# Patient Record
Sex: MALE | Race: WHITE | URBAN - NONMETROPOLITAN AREA
[De-identification: names, ages, dates, MRNs, and addresses within clinical notes are randomized per-mention and may not be internally consistent; named-entity substitution may affect disease eponyms.]

---

## 2017-01-01 ENCOUNTER — AMBULATORY - GICH (OUTPATIENT)
Dept: SCHEDULING | Facility: OTHER | Age: 72
End: 2017-01-01

## 2017-01-01 ENCOUNTER — COMMUNICATION - GICH (OUTPATIENT)
Dept: INTERNAL MEDICINE | Facility: OTHER | Age: 72
End: 2017-01-01

## 2017-01-01 ENCOUNTER — HISTORY (OUTPATIENT)
Dept: INTERNAL MEDICINE | Facility: OTHER | Age: 72
End: 2017-01-01

## 2017-01-01 ENCOUNTER — COMMUNICATION - GICH (OUTPATIENT)
Dept: GERIATRICS | Facility: OTHER | Age: 72
End: 2017-01-01

## 2017-01-01 ENCOUNTER — TRANSFERRED RECORDS (OUTPATIENT)
Dept: HEALTH INFORMATION MANAGEMENT | Facility: CLINIC | Age: 72
End: 2017-01-01

## 2017-01-01 ENCOUNTER — AMBULATORY - GICH (OUTPATIENT)
Dept: LAB | Facility: OTHER | Age: 72
End: 2017-01-01

## 2017-01-01 ENCOUNTER — OFFICE VISIT - GICH (OUTPATIENT)
Dept: INTERNAL MEDICINE | Facility: OTHER | Age: 72
End: 2017-01-01

## 2017-01-01 ENCOUNTER — HISTORY (OUTPATIENT)
Dept: FAMILY MEDICINE | Facility: OTHER | Age: 72
End: 2017-01-01

## 2017-01-01 ENCOUNTER — HOSPITAL ENCOUNTER (OUTPATIENT)
Dept: RADIOLOGY | Facility: OTHER | Age: 72
End: 2017-03-10
Attending: NURSE PRACTITIONER

## 2017-01-01 ENCOUNTER — AMBULATORY - GICH (OUTPATIENT)
Dept: INTERNAL MEDICINE | Facility: OTHER | Age: 72
End: 2017-01-01

## 2017-01-01 ENCOUNTER — HOSPITAL ENCOUNTER (OUTPATIENT)
Dept: RADIOLOGY | Facility: OTHER | Age: 72
End: 2017-03-09
Attending: NURSE PRACTITIONER

## 2017-01-01 ENCOUNTER — HOSPITAL ENCOUNTER (OUTPATIENT)
Dept: RADIOLOGY | Facility: OTHER | Age: 72
End: 2017-03-07
Attending: NURSE PRACTITIONER

## 2017-01-01 ENCOUNTER — MEDICAL CORRESPONDENCE (OUTPATIENT)
Facility: CLINIC | Age: 72
End: 2017-01-01
Payer: MEDICARE

## 2017-01-01 ENCOUNTER — OFFICE VISIT - GICH (OUTPATIENT)
Dept: FAMILY MEDICINE | Facility: OTHER | Age: 72
End: 2017-01-01

## 2017-01-01 DIAGNOSIS — Z12.11 ENCOUNTER FOR SCREENING FOR MALIGNANT NEOPLASM OF COLON: ICD-10-CM

## 2017-01-01 DIAGNOSIS — R10.10 UPPER ABDOMINAL PAIN: ICD-10-CM

## 2017-01-01 DIAGNOSIS — R93.7 ABNORMAL FINDINGS ON DIAGNOSTIC IMAGING OF OTHER PARTS OF MUSCULOSKELETAL SYSTEM: ICD-10-CM

## 2017-01-01 DIAGNOSIS — I48.3 TYPICAL ATRIAL FLUTTER (H): ICD-10-CM

## 2017-01-01 DIAGNOSIS — R00.0 TACHYCARDIA: ICD-10-CM

## 2017-01-01 DIAGNOSIS — R63.4 ABNORMAL WEIGHT LOSS: ICD-10-CM

## 2017-01-01 DIAGNOSIS — K92.1 MELENA: ICD-10-CM

## 2017-01-01 DIAGNOSIS — M54.10 RADICULOPATHY: ICD-10-CM

## 2017-01-01 DIAGNOSIS — G89.29 OTHER CHRONIC PAIN: ICD-10-CM

## 2017-01-01 DIAGNOSIS — M05.79 RHEUMATOID ARTHRITIS OF MULTIPLE SITES WITHOUT ORGAN OR SYSTEM INVOLVEMENT WITH POSITIVE RHEUMATOID FACTOR (H): ICD-10-CM

## 2017-01-01 DIAGNOSIS — E03.4 ACQUIRED ATROPHY OF THYROID: ICD-10-CM

## 2017-01-01 DIAGNOSIS — M54.42 LOW BACK PAIN WITH LEFT-SIDED SCIATICA: ICD-10-CM

## 2017-01-01 DIAGNOSIS — R35.1 NOCTURIA: ICD-10-CM

## 2017-01-01 DIAGNOSIS — M54.41 LOW BACK PAIN WITH RIGHT-SIDED SCIATICA: ICD-10-CM

## 2017-01-01 DIAGNOSIS — R05.9 COUGH: ICD-10-CM

## 2017-01-01 DIAGNOSIS — Z72.0 TOBACCO USE: ICD-10-CM

## 2017-01-01 DIAGNOSIS — R10.84 GENERALIZED ABDOMINAL PAIN: ICD-10-CM

## 2017-01-01 LAB
A/G RATIO - HISTORICAL: 0.9 (ref 1–2)
ABSOLUTE BASOPHILS - HISTORICAL: 0.2 THOU/CU MM
ABSOLUTE EOSINOPHILS - HISTORICAL: 0.1 THOU/CU MM
ABSOLUTE LYMPHOCYTES - HISTORICAL: 0.9 THOU/CU MM (ref 0.9–2.9)
ABSOLUTE MONOCYTES - HISTORICAL: 1.1 THOU/CU MM
ABSOLUTE NEUTROPHILS - HISTORICAL: 9.7 THOU/CU MM (ref 1.7–7)
ALBUMIN SERPL-MCNC: 3.4 G/DL (ref 3.5–5.7)
ALP SERPL-CCNC: 75 IU/L (ref 34–104)
ALT (SGPT) - HISTORICAL: 33 IU/L (ref 7–52)
AMYLASE SERPL-CCNC: 28 IU/L (ref 29–103)
ANION GAP - HISTORICAL: 9 (ref 5–18)
AST SERPL-CCNC: 32 IU/L (ref 13–39)
BACTERIA URINE: NORMAL BACTERIA/HPF
BASOPHILS # BLD AUTO: 1.9 %
BILIRUB SERPL-MCNC: 0.9 MG/DL (ref 0.3–1)
BILIRUB UR QL: ABNORMAL
BUN SERPL-MCNC: 20 MG/DL (ref 7–25)
BUN/CREAT RATIO - HISTORICAL: 23
CALCIUM SERPL-MCNC: 9.1 MG/DL (ref 8.6–10.3)
CHLORIDE SERPLBLD-SCNC: 96 MMOL/L (ref 98–107)
CLARITY, URINE: CLEAR CLARITY
CO2 SERPL-SCNC: 25 MMOL/L (ref 21–31)
COLOR UR: YELLOW COLOR
CREAT SERPL-MCNC: 0.87 MG/DL (ref 0.7–1.3)
EOSINOPHIL NFR BLD AUTO: 0.9 %
EPITHELIAL CELLS: NORMAL EPI/HPF
ERYTHROCYTE [DISTWIDTH] IN BLOOD BY AUTOMATED COUNT: 15.1 % (ref 11.5–15.5)
GFR IF NOT AFRICAN AMERICAN - HISTORICAL: >60 ML/MIN/1.73M2
GLOBULIN - HISTORICAL: 3.7 G/DL (ref 2–3.7)
GLUCOSE SERPL-MCNC: 111 MG/DL (ref 70–105)
GLUCOSE URINE: NEGATIVE MG/DL
HCT VFR BLD AUTO: 41.2 % (ref 37–53)
HEMOCCULT SP1 STL QL: POSITIVE
HEMOCCULT SP2 STL QL: POSITIVE
HEMOCCULT SP3 STL QL: NEGATIVE
HEMOGLOBIN: 13.6 G/DL (ref 13.5–17.5)
KETONES UR QL: NEGATIVE MG/DL
LEUKOCYTE ESTERASE URINE: NEGATIVE
LIPASE SERPL-CCNC: 8.8 IU/L (ref 11–82)
LYMPHOCYTES NFR BLD AUTO: 7.9 % (ref 20–44)
MCH RBC QN AUTO: 33 PG (ref 26–34)
MCHC RBC AUTO-ENTMCNC: 33.1 G/DL (ref 32–36)
MCV RBC AUTO: 100 FL (ref 80–100)
MONOCYTES NFR BLD AUTO: 9 %
NEUTROPHILS NFR BLD AUTO: 80.3 % (ref 42–72)
NITRITE UR QL STRIP: NEGATIVE
OCCULT BLOOD,URINE - HISTORICAL: NEGATIVE
PH UR: 5.5 [PH]
PLATELET # BLD AUTO: 339 THOU/CU MM (ref 140–440)
PMV BLD: 8.8 FL (ref 6.5–11)
POTASSIUM SERPL-SCNC: 4.6 MMOL/L (ref 3.5–5.1)
PROT SERPL-MCNC: 7.1 G/DL (ref 6.4–8.9)
PROTEIN QUALITATIVE,URINE - HISTORICAL: NEGATIVE MG/DL
PSA TOTAL (DIAGNOSTIC) - HISTORICAL: 0.3 NG/ML
RBC - HISTORICAL: NORMAL /HPF
RED BLOOD COUNT - HISTORICAL: 4.13 MIL/CU MM (ref 4.3–5.9)
SODIUM SERPL-SCNC: 130 MMOL/L (ref 133–143)
SP GR UR STRIP: 1.02
SPECIMEN DATE DAY 1 - HISTORICAL: ABNORMAL
SPECIMEN DATE DAY 2 - HISTORICAL: ABNORMAL
SPECIMEN DATE DAY 3 - HISTORICAL: ABNORMAL
T3,FREE - HISTORICAL: 3.24 PG/ML (ref 2.5–3.9)
T4 FREE SERPL-MCNC: 1.39 NG/DL (ref 0.58–1.64)
TSH - HISTORICAL: 3.91 UIU/ML (ref 0.34–5.6)
UROBILINOGEN,QUALITATIVE - HISTORICAL: NORMAL EU/DL
WBC - HISTORICAL: NORMAL /HPF
WHITE BLOOD COUNT - HISTORICAL: 12.1 THOU/CU MM (ref 4.5–11)

## 2017-01-01 PROCEDURE — 93306 TTE W/DOPPLER COMPLETE: CPT | Mod: 26 | Performed by: INTERNAL MEDICINE

## 2017-01-01 ASSESSMENT — PATIENT HEALTH QUESTIONNAIRE - PHQ9: SUM OF ALL RESPONSES TO PHQ QUESTIONS 1-9: 0

## 2017-03-21 ENCOUNTER — AMBULATORY - GICH (OUTPATIENT)
Dept: SCHEDULING | Facility: OTHER | Age: 72
End: 2017-03-21

## 2018-01-03 NOTE — TELEPHONE ENCOUNTER
Patient Information     Patient Name MRN Sex Shine Bustamante [id number] Male 1945      Telephone Encounter by Keli Navarrete at 3/7/2017 11:36 AM     Author:  Keli Navarrete Service:  (none) Author Type:  (none)     Filed:  3/7/2017 11:36 AM Encounter Date:  3/7/2017 Status:  Signed     :  Keli Navarrete            Patient's wife notified.  Keli Navarrete LPN.......................... 3/7/2017  11:36 AM

## 2018-01-03 NOTE — NURSING NOTE
Patient Information     Patient Name MRN Sex Shine Bustamante [id number] Male 1945      Nursing Note by Marina Sun at 2017  9:00 AM     Author:  Marina Sun Service:  (none) Author Type:  (none)     Filed:  2017  9:20 AM Encounter Date:  2017 Status:  Signed     :  Marina Sun            Patient presents to the clinic with abdominal pain x 2 weeks.  He is able to eat and the pain does seem to get better when he eats.  He has arthritis and typically takes 4 ibuprofen BID.  Has stopped a couple of weeks ago.    He has not tried any OTC medications.    Marina Sun LPN....................2017 9:02 AM

## 2018-01-03 NOTE — PROGRESS NOTES
Patient Information     Patient Name MRN Sex Shine Bustamante [id number] Male 1945      Progress Notes by Wendy Barnes at 3/10/2017  9:16 AM     Author:  Wendy Barnes Service:  (none) Author Type:  Other Clinical Staff     Filed:  3/10/2017  9:17 AM Date of Service:  3/10/2017  9:16 AM Status:  Signed     :  Wendy Barnes (Other Clinical Staff)            Falls Risk Criteria:    Age 65 and older or under age 4        Sensory deficits    Poor vision    Use of ambulatory aides    Impaired judgment    Unable to walk independently    Meets High Risk criteria for falls:  Yes             1.  Do you have dizziness or vertigo?    no                    2.  Do you need help standing or walking?   yes                 3.  Have you fallen within the last 6 months?    no           4.  Has the patient been fasting?      no       If any risks are marked Yes, the following interventions are utilized:    Do not leave patient unattended     Assist patient in the dressing room and bathroom    Have ambulatory aides available throughout procedure    Involve patient s family if available      3

## 2018-01-03 NOTE — PROGRESS NOTES
Patient Information     Patient Name MRN Sex Shine Bustamante [id number] Male 1945      Progress Notes by Laura Lester at 3/9/2017 10:55 AM     Author:  Laura Lester Service:  (none) Author Type:  Other Clinical Staff     Filed:  3/9/2017 10:56 AM Date of Service:  3/9/2017 10:55 AM Status:  Signed     :  Laura Lester (Other Clinical Staff)            Falls Risk Criteria:    Age 65 and older or under age 4        Sensory deficits    Poor vision    Use of ambulatory aides    Impaired judgment    Unable to walk independently    Meets High Risk criteria for falls:  Yes             1.  Do you have dizziness or vertigo?    yes                    2.  Do you need help standing or walking?   yes                 3.  Have you fallen within the last 6 months?    no           4.  Has the patient been fasting?      yes       If any risks are marked Yes, the following interventions are utilized:    Do not leave patient unattended     Assist patient in the dressing room and bathroom    Have ambulatory aides available throughout procedure    Involve patient s family if available

## 2018-01-03 NOTE — TELEPHONE ENCOUNTER
Patient Information     Patient Name MRN Sex Shine Bustamante [id number] Male 1945      Telephone Encounter by Ruma Medina LPN at 3/7/2017 10:56 AM     Author:  Ruma Median LPN Service:  (none) Author Type:  NURS- Licensed Practical Nurse     Filed:  3/7/2017 10:58 AM Encounter Date:  3/7/2017 Status:  Signed     :  Ruma Medina LPN (NURS- Licensed Practical Nurse)            Left message for patient to return call, he was just seen. [facility] had printed out an Rx for lortab and this was not given to him. Rx is up at the unit 3 window. Please inform him.  Ruma Medina LPN.........3/7/2017   10:57 AM

## 2018-01-03 NOTE — PROGRESS NOTES
Patient Information     Patient Name MRN Sex Shine Bustamante [id number] Male 1945      Progress Notes by Triny Huff RN at 3/13/2017  5:56 PM     Author:  Triny Huff RN Service:  (none) Author Type:  NURS- Registered Nurse     Filed:  3/13/2017  5:58 PM Date of Service:  3/13/2017  5:56 PM Status:  Signed     :  Triny Huff RN (NURS- Registered Nurse)            This RN working as House Supervisor was called for medical records to be sent to [facility] in [place] [employer] for continuing care. Patient was being seen in their ER.

## 2018-01-03 NOTE — PROGRESS NOTES
Patient Information     Patient Name MRN Sex Shine Bustamante [id number] Male 1945      Progress Notes by Марина Sinha NP at 3/7/2017  8:10 AM     Author:  Марина Sinha NP Service:  (none) Author Type:  PHYS- Nurse Practitioner     Filed:  3/7/2017 12:05 PM Encounter Date:  3/7/2017 Status:  Signed     :  Марина Sinha NP (PHYS- Nurse Practitioner)            SUBJECTIVE:    Shine Zimmerman is a 72 y.o. male who presents for abdominal pain    HPI  he and his wife report that he has had abdominal pain for over one month. The pain is constant and located in the mid to upper abdomen and sometimes he will feel diffuse pain across his entire abdomen. He feels that the pain is getting worse. He has had a loss of appetite and has lost 20 pounds since last fall. He is not trying to lose weight. He had been drinking approximately 4 cans of beer per day but now only occasionally has one beer per day because of the poor appetite. He also takes ibuprofen 800 mg twice daily for rheumatoid arthritis pain. He switched over to Aleve 4 tablets daily for a couple weeks without improvement so returned back to the ibuprofen. He did have darker stools the past few days. He was seen in clinic approximately 3 weeks ago and was given a prescription for MiraLAX. He has been drinking that daily. He has been moving his bowels without difficulty but this does not seem to improve the abdominal pain. He does not have heartburn. No hematemesis. He continues to smoke 1-1-1/2 packs of cigarettes per day. He has had a cough for the past month that is productive of white phlegm. He does feel short of breath at times with coughing and is getting weaker.  He also has this chronic left flank pain. He has pain that radiates down the spine, into the buttocks and down to both knees. This is been occurring for several months. He had an MRI of the lumbar spine in 2016 which showed multilevel  degenerative disc disease. There was bulging disc at the T11-T12 level with some mass affect on the distal cord and some effacement of the CSF. Thoracic spine MRI was recommended by radiologist. He has not been set up for this thoracic MRI. He also did see neurologist Dr. Bruce and had EMG completed. Those results are not available. He was told that radicular symptoms may be related to chronic alcohol use. He also was recently found to have elevated TSH but then it was rechecked last month and normal. He is not on any thyroid medication.  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    No Known Allergies,   Family History       Problem   Relation Age of Onset     Cancer  Mother 40     reproductive       Unknown  Father      Alcoholism  Father      Unknown  Sister      Unknown  Brother      Unknown  Maternal Grandmother      Unknown  Maternal Grandfather      Unknown  Paternal Grandmother      Unknown  Paternal Grandfather    ,   Current Outpatient Prescriptions on File Prior to Visit       Medication  Sig Dispense Refill     polyethylene glycoL (CLEARLAX) 17 gram/dose powder Take 17 g by mouth once daily. 1 jar 2     No current facility-administered medications on file prior to visit.    ,   Past Medical History     Diagnosis  Date     Substance abuse    ,   Past Surgical History       Procedure   Laterality Date     Carpal tunnel release        bilaterally       Biopsy        subcutaneous nodule, benign       Hemorrhoid surgery       and   Social History        Substance Use Topics          Smoking status:   Current Every Day Smoker      Packs/day:  1.00      Types:  Cigarettes      Smokeless tobacco:   Never Used      Alcohol use   3.0 oz/week     0 Standard drinks or equivalent, 5 Cans of beer per week        Comment: beer         REVIEW OF SYSTEMS:  Review of Systems   Constitutional: Positive for malaise/fatigue and weight loss. Negative for chills, diaphoresis and fever.   HENT: Negative.   "  Eyes: Negative.    Respiratory: Positive for cough, sputum production and shortness of breath. Negative for hemoptysis and wheezing.    Cardiovascular: Negative.    Gastrointestinal: Positive for abdominal pain and melena. Negative for blood in stool, constipation, diarrhea, heartburn, nausea and vomiting.   Genitourinary: Positive for flank pain. Negative for dysuria and hematuria.        Chronic left flank, mid back pain as discussed in history of present illness.   Musculoskeletal: Positive for back pain and joint pain. Negative for falls.   Skin: Negative for rash.   Neurological: Positive for tingling, focal weakness and weakness. Negative for dizziness.        Reports weakness of both legs   Endo/Heme/Allergies: Negative for polydipsia. Does not bruise/bleed easily.   Psychiatric/Behavioral: Positive for substance abuse. Negative for depression and memory loss. The patient is not nervous/anxious and does not have insomnia.        OBJECTIVE:  /90  Pulse (!) 129  Temp 96  F (35.6  C) (Tympanic)  Ht 1.816 m (5' 11.5\")  Wt 90.3 kg (199 lb)  SpO2 93%  BMI 27.37 kg/m2  By reviewing previous clinic visit notes and vitals, tachycardia is not new.    EXAM:   Physical Exam   Constitutional: He is oriented to person, place, and time. No distress.   Ashen appearing, does not look. Appears chronically but not acutely ill   HENT:   Mouth/Throat: Oropharynx is clear and moist. No oropharyngeal exudate.   TMs clear bilaterally. Throat without erythema. No sores inside  mouth   Eyes: Conjunctivae are normal. No scleral icterus.   Neck: Neck supple. No JVD present. No thyromegaly present.   Cardiovascular: Normal rate and regular rhythm.  Exam reveals no gallop and no friction rub.    No murmur heard.  Tachycardia, 120 bpm   Pulmonary/Chest: Effort normal. No stridor. No respiratory distress. He has no wheezes. He has no rales.   Faint rhonchi left lung   Abdominal: Soft. Bowel sounds are normal. He exhibits no " distension and no mass. There is tenderness. There is no rebound and no guarding.   Diffuse tenderness in the upper abdomen mostly located between the epigastric and umbilicus. No hepatosplenomegaly palpable. No abdominal bruits or pulsatile masses. No CVA tenderness.   Musculoskeletal: He exhibits deformity. He exhibits no edema.   Deformities of hands secondary to rheumatoid arthritis   Lymphadenopathy:     He has no cervical adenopathy.   Neurological: He is alert and oriented to person, place, and time. He exhibits normal muscle tone. Coordination normal.   Normal strength bilateral lower extremities. No tenderness with palpation over the thoracic or lumbar spinal processes. He rubs over the left flank area as the area of chronic pain.   Skin: Skin is warm. Rash noted. He is not diaphoretic. No pallor.   Diffuse light pink macular rash over lower abdomen   Psychiatric: Mood, memory, affect and judgment normal.   Nursing note and vitals reviewed.  September 2016 abdominal ultrasound results with no findings for cause of abdominal pain.  September 2016 MRI lumbar spine results reviewed and discussed with patient, see history of present illness for details.    EKG: Atrial flutter 2:1 AV conduction, rate 128  chest x-ray: Small bilateral pleural effusions with mild interstitial edema as interpreted by radiologist    Results for orders placed or performed in visit on 03/07/17      COMP METABOLIC PANEL      Result  Value Ref Range    SODIUM 130 (L) 133 - 143 mmol/L    POTASSIUM 4.6 3.5 - 5.1 mmol/L    CHLORIDE 96 (L) 98 - 107 mmol/L    CO2,TOTAL 25 21 - 31 mmol/L    ANION GAP 9 5 - 18                    GLUCOSE 111 (H) 70 - 105 mg/dL    CALCIUM 9.1 8.6 - 10.3 mg/dL    BUN 20 7 - 25 mg/dL    CREATININE 0.87 0.70 - 1.30 mg/dL    BUN/CREAT RATIO           23                    GFR if African American >60 >60 ml/min/1.73m2    GFR if not African American >60 >60 ml/min/1.73m2    ALBUMIN 3.4 (L) 3.5 - 5.7 g/dL     PROTEIN,TOTAL 7.1 6.4 - 8.9 g/dL    GLOBULIN                  3.7 2.0 - 3.7 g/dL    A/G RATIO 0.9 (L) 1.0 - 2.0                    BILIRUBIN,TOTAL 0.9 0.3 - 1.0 mg/dL    ALK PHOSPHATASE 75 34 - 104 IU/L    ALT (SGPT) 33 7 - 52 IU/L    AST (SGOT) 32 13 - 39 IU/L   AMYLASE      Result  Value Ref Range    AMYLASE 28 (L) 29 - 103 IU/L   LIPASE      Result  Value Ref Range    LIPASE 8.8 (L) 11.0 - 82.0 IU/L   PSA TOTAL (DIAGNOSTIC)      Result  Value Ref Range    PSA TOTAL (DIAGNOSTIC) 0.296 <=3.100 ng/mL   CBC WITH AUTO DIFFERENTIAL      Result  Value Ref Range    WHITE BLOOD COUNT         12.1 (H) 4.5 - 11.0 thou/cu mm    RED BLOOD COUNT           4.13 (L) 4.30 - 5.90 mil/cu mm    HEMOGLOBIN                13.6 13.5 - 17.5 g/dL    HEMATOCRIT                41.2 37.0 - 53.0 %    MCV                       100 80 - 100 fL    MCH                       33.0 26.0 - 34.0 pg    MCHC                      33.1 32.0 - 36.0 g/dL    RDW                       15.1 11.5 - 15.5 %    PLATELET COUNT            339 140 - 440 thou/cu mm    MPV                       8.8 6.5 - 11.0 fL    NEUTROPHILS               80.3 (H) 42.0 - 72.0 %    LYMPHOCYTES               7.9 (L) 20.0 - 44.0 %    MONOCYTES                 9.0 <12.0 %    EOSINOPHILS               0.9 <8.0 %    BASOPHILS                 1.9 <3.0 %    ABSOLUTE NEUTROPHILS      9.7 (H) 1.7 - 7.0 thou/cu mm    ABSOLUTE LYMPHOCYTES      0.9 0.9 - 2.9 thou/cu mm    ABSOLUTE MONOCYTES        1.1 (H) <0.9 thou/cu mm    ABSOLUTE EOSINOPHILS      0.1 <0.5 thou/cu mm    ABSOLUTE BASOPHILS        0.2 <0.3 thou/cu mm     ASSESSMENT/PLAN:    ICD-10-CM    1. Pain of upper abdomen R10.10 CBC AND DIFFERENTIAL      COMP METABOLIC PANEL      AMYLASE      LIPASE      XR CHEST 2 VIEWS PA AND LATERAL      CT ABDOMEN PELVIS W      omeprazole (PRILOSEC) 40 mg Delayed-Release capsule      CBC AND DIFFERENTIAL      COMP METABOLIC PANEL      AMYLASE      LIPASE      CBC WITH AUTO DIFFERENTIAL      OCCULT  BLOOD STOOL 3 SPECIMENS GUAIAC   2. Weight loss R63.4 CBC AND DIFFERENTIAL      COMP METABOLIC PANEL      AMYLASE      LIPASE      XR CHEST 2 VIEWS PA AND LATERAL      CT ABDOMEN PELVIS W      CBC AND DIFFERENTIAL      COMP METABOLIC PANEL      AMYLASE      LIPASE      CBC WITH AUTO DIFFERENTIAL   3. Melena K92.1 OCCULT BLOOD STOOL 3 SPECIMENS GUAIAC   4. Abnormal MRI, spine R93.7 CBC AND DIFFERENTIAL      MR SPINE THORACIC WO      CBC AND DIFFERENTIAL      CBC WITH AUTO DIFFERENTIAL   5. Radiculopathy, unspecified spinal region M54.10 MR SPINE THORACIC WO   6. 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 Cough R05 CBC AND DIFFERENTIAL      XR CHEST 2 VIEWS PA AND LATERAL      CBC AND DIFFERENTIAL      CBC WITH AUTO DIFFERENTIAL   8. Nocturia R35.1 PSA TOTAL (DIAGNOSTIC)      URINALYSIS W REFLEX MICROSCOPIC IF POSITIVE      PSA TOTAL (DIAGNOSTIC)   9. Tachycardia R00.0 EKG 12 LEAD UNIT PERFORMED      AL ELECTROCARDIOGRAM COMPLETE   10. Typical atrial flutter (HC) I48.3 metoprolol succinate (TOPROL XL) 50 mg sustained-release tablet      ECHO COMPLETE WO CONTRAST   11. Rheumatoid arthritis involving multiple sites with positive rheumatoid factor (HC) M05.79 HYDROcodone-acetaminophen, 5-325 mg, (LORTAB 5-325) per tablet   12. Encounter for screening for malignant neoplasm of colon  Z12.11 OCCULT BLOOD STOOL 3 SPECIMENS GUAIAC   13. Tobacco abuse Z72.0         Plan:  1. Abdominal pain, weight loss, melana: Concerning for NSAID gastritis, PUD, malignancy, etc. He will discontinue all NSAIDs and also avoid aspirin products. He will be started on omeprazole 40 mg once daily. CT of abdomen and pelvis with contrast will be scheduled. Patient may additionally need panendoscopy. Will discuss CT of abdomen and pelvis results and Hemoccults studies with patient before determining if panendoscopy is warranted. 2. Rheumatoid arthritis: Patient has chronic RA pain which she is  taking NSAIDs. Since he is going to be stopping the NSAIDs he will be started on Lortab 5/325 mg 1-2 tablets 3 times daily as needed. Should not take medication at work, operating vehicle or machinery as this could cause fatigue. Other side effects reviewed and discussed with patient. He will also remain on the MiraLAX to prevent [id number] constipation. 3. Atrial flutter: He will be started on Toprol-XL 50 mg daily and an echocardiogram will be obtained. Discussed with patient that he is at high risk of CVA however with his GI symptoms do not want to add anticoagulation until Hemoccults are obtained. If these are negative then may need to start him on anticoagulation however will review CT of abdomen and pelvis results and determine if panendoscopy needs to be completed first.    He also will obtain a UA and bring back to lab this week with hemoccult cards.  4. Left mid low back pain and abnormal MRI (T11-12) and radiculopathy: He will be scheduled for MRI of thoracic spine. Explained to patient that his abdominal pain could be referred pain from his back.  Follow-up appointment in clinic in 1 week, sooner if any worsening. Side effects of omeprazole and Toprol-XL also were reviewed and discussed the patient and his wife.   Greater than 60 minutes of face-to-face time is spent with patient and his wife  Patient Instructions   1. Stop ibuprofen and aleve. Start Lortab 5/325 mg 1-2 tablets 3 times daily as needed for rheumatoid arthritis pain. Start omeprazole 40 mg daily for upper abdominal discomfort. Obtain heme occult cards from 3 different stools and bring back to clinic lab once obtained.  You will be scheduled for CT of abdomen and pelvis. Depending upon heme occult results, may need to be set up for upper and lower endoscopy. Also try to bring back the urine sample.  2. You will be started on Toprol-XL 50 mg daily to slow your heart rate. You have atrial flutter. You also will be set up for an  echocardiogram of your heart. You may need to start and anticoagulated but will not decide this until after we have more evaluation of your stomach.  3. You also will be set up for an MRI of the thoracic spine to evaluate your back pain further and also because of the questionable abnormality on the previous MRI.  Follow-up in clinic in 1 week, sooner with any problems.

## 2018-01-03 NOTE — TELEPHONE ENCOUNTER
Patient Information     Patient Name MRN Sex Shine Bustamante [id number] Male 1945      Telephone Encounter by Марина Sinha NP at 3/10/2017  9:24 AM     Author:  Марина Sinha NP Service:  (none) Author Type:  PHYS- Nurse Practitioner     Filed:  3/10/2017  9:31 AM Encounter Date:  3/10/2017 Status:  Signed     :  Марина Sinha NP (PHYS- Nurse Practitioner)            Called and spoke with [facility]'s cardiologist, Dr Christina regarding 2:1 a flutter with rate in 120's and EF of 13% with severe global hypokinesia.  Their office will set up appt to see patient early next week.  He recommended that patient stay on Toprol-XL 50 mg and that if he develops symptoms of acute heart failure before cardiology visit to be seen in the emergency department. Decided against anticoagulation at this time with patient's epigastric discomfort and melena even though hemoglobin was normal. Patient is still to bring in stool sample to evaluate for occult bleeding. Also notified patient and his wife of echocardiogram results and explained to them that cardiology will be giving them a call to set up an appointment for early next week. Also discussed signs and symptoms of acute heart failure decompensation and if he has any problems he'll be seen in the emergency department prior to cardiology visit.

## 2018-01-03 NOTE — NURSING NOTE
Patient Information     Patient Name MRN Sex Shine Bustamante [id number] Male 1945      Nursing Note by Ruma Medina LPN at 3/7/2017  8:10 AM     Author:  Ruma Medina LPN Service:  (none) Author Type:  NURS- Licensed Practical Nurse     Filed:  3/7/2017  8:25 AM Encounter Date:  3/7/2017 Status:  Signed     :  Ruma Medina LPN (NURS- Licensed Practical Nurse)            Shine Zimmerman is a 72 y.o. male here today for second opinion on ongoing stomach pain for the last couple weeks. He states that the pain is constant and at times it becomes worse then normal.  Ruma Medina LPN.........3/7/2017   8:09 AM

## 2018-01-03 NOTE — PROGRESS NOTES
Patient Information     Patient Name MRN Sex Shine Bustamante [id number] Male 1945      Progress Notes by Laura Lester at 3/9/2017 10:55 AM     Author:  Laura Lester Service:  (none) Author Type:  Other Clinical Staff     Filed:  3/9/2017 10:55 AM Date of Service:  3/9/2017 10:55 AM Status:  Signed     :  Laura Lester (Other Clinical Staff)            1.  Has the patient had a previous reaction to IV contrast? No    2.  Does the patient have kidney disease? No    3.  Is the patient on dialysis? No    If YES to any of these questions, exam will be reviewed with a Radiologist before administering contrast.

## 2018-01-03 NOTE — PROGRESS NOTES
Patient Information     Patient Name MRN Sex Shine Bustamante [id number] Male 1945      Progress Notes by Sid Valverde MD at 2017  9:00 AM     Author:  Sid Valverde MD Service:  (none) Author Type:  Physician     Filed:  2017  9:39 AM Encounter Date:  2017 Status:  Signed     :  Sid Valverde MD (Physician)            Nursing Notes:   Marina uSn  2017  9:20 AM  Signed  Patient presents to the clinic with abdominal pain x 2 weeks.  He is able to eat and the pain does seem to get better when he eats.  He has arthritis and typically takes 4 ibuprofen BID.  Has stopped a couple of weeks ago.    He has not tried any OTC medications.    Marina Sun LPN....................2017 9:02 AM    Shine Zimmerman is a 72 y.o. male who presents for   Chief Complaint    Patient presents with      Abdominal Pain     HPI: Mr. Zimmerman has generalized abdomen pain for the past 2 weeks; it is better after eating. No nausea; his BMs are harder than usual but he is having BM daily. He is constipated enough though that it is a strain to have a BM; he has also had a cough for the same time. Afeb. No known exposures; he has been recently evaluated for a back/ flank with Abdomen US in November that showed no abnormalities.  ache problem with no specific diagnosis. Appetite is unchanged. No voiding change.     No past medical history on file.  No past surgical history on file.  No family history on file.  Current Outpatient Prescriptions       Medication  Sig Dispense Refill     ibuprofen (ADVIL; MOTRIN) 200 mg tablet Take 1 tablet by mouth every 6 hours if needed. Takes 600mg after a day of work  0     No current facility-administered medications for this visit.      Medications have been reviewed by me and are current to the best of my knowledge and ability.    No Known Allergies     EXAM:   Vitals:     17 0902   BP: 114/76   Temp: 97.2  F (36.2  C)   TempSrc:  "Temporal   Weight: 83.9 kg (185 lb)   Height: 1.82 m (5' 11.65\")     General Appearance: Pleasant, alert, appropriate appearance for age. No acute distress  Chest/Respiratory Exam: Normal chest wall and respirations. Clear to auscultation.  Cardiovascular Exam: Regular rate and rhythm. Tachycardia at 100;  S1, S2, no murmur, click, gallop, or rubs.  Gastrointestinal Exam: generalized minimal tenderness with normal BS and no masses  ASSESSMENT AND PLAN:  1. Generalized abdominal pain  This may be constipation related- I will have him start Clearlax and recheck if not improving;   2 Hypothyroid- he had elevated TSH in Sept but did not come in for T3T4- likely in need of levothyroxine- will do T3T4 today and start med / reche Creatine kinase TSH in 2 months as well as today                 "

## 2018-01-03 NOTE — PROGRESS NOTES
Patient Information     Patient Name MRN Sex Shine Bustamante [id number] Male 1945      Progress Notes by Alannah Johnson RN at 3/9/2017 12:01 PM     Author:  Alannah Johnson RN Service:  (none) Author Type:  NURS- Registered Nurse     Filed:  3/9/2017 12:02 PM Date of Service:  3/9/2017 12:01 PM Status:  Signed     :  Alannah Johnson RN (NURS- Registered Nurse)            Iv/saline lock placed for echo procedure and CT testing today

## 2018-01-26 VITALS
DIASTOLIC BLOOD PRESSURE: 90 MMHG | TEMPERATURE: 96 F | SYSTOLIC BLOOD PRESSURE: 136 MMHG | HEART RATE: 129 BPM | BODY MASS INDEX: 26.95 KG/M2 | OXYGEN SATURATION: 93 % | HEIGHT: 72 IN | WEIGHT: 199 LBS

## 2018-01-26 VITALS
WEIGHT: 185 LBS | DIASTOLIC BLOOD PRESSURE: 76 MMHG | HEIGHT: 72 IN | SYSTOLIC BLOOD PRESSURE: 114 MMHG | TEMPERATURE: 97.2 F | BODY MASS INDEX: 25.06 KG/M2

## 2018-01-30 ENCOUNTER — DOCUMENTATION ONLY (OUTPATIENT)
Dept: FAMILY MEDICINE | Facility: OTHER | Age: 73
End: 2018-01-30

## 2018-01-30 PROBLEM — I48.3 TYPICAL ATRIAL FLUTTER (H): Status: ACTIVE | Noted: 2017-01-01

## 2018-01-30 RX ORDER — POLYETHYLENE GLYCOL 3350 17 G/17G
17 POWDER, FOR SOLUTION ORAL DAILY
COMMUNITY
Start: 2017-01-01

## 2018-01-30 RX ORDER — OMEPRAZOLE 40 MG/1
40 CAPSULE, DELAYED RELEASE ORAL DAILY
COMMUNITY
Start: 2017-01-01

## 2018-01-30 RX ORDER — METOPROLOL SUCCINATE 50 MG/1
50 TABLET, EXTENDED RELEASE ORAL DAILY
COMMUNITY
Start: 2017-01-01

## 2018-01-30 RX ORDER — HYDROCODONE BITARTRATE AND ACETAMINOPHEN 5; 325 MG/1; MG/1
1-2 TABLET ORAL 3 TIMES DAILY PRN
COMMUNITY
Start: 2017-01-01

## 2018-01-30 ASSESSMENT — PATIENT HEALTH QUESTIONNAIRE - PHQ9: SUM OF ALL RESPONSES TO PHQ QUESTIONS 1-9: 0

## 2018-07-23 NOTE — PROGRESS NOTES
Patient Information     Patient Name  Shine Zimmerman MRN  [id number] Sex  Male   1945      Letter by Sid Valverde MD at      Author:  Sid Valverde MD Service:  (none) Author Type:  (none)    Filed:   Encounter Date:  2017 Status:  (Other)           Shine Zimmerman  [address] [facility] [id number]          2017    Dear Mr. Zimmerman:    Your thyroid levels are now all perfect so you should not take the thyroid medication.  Sid Valverde MD ....................  2017   2:16 PM

## 2023-04-04 NOTE — PATIENT INSTRUCTIONS
Patient Information     Patient Name MRN Sex Shine Bustamante [id number] Male 1945      Patient Instructions by Марина Sinha NP at 3/7/2017  8:10 AM     Author:  Марина Sinha NP  Service:  (none) Author Type:  PHYS- Nurse Practitioner     Filed:  3/7/2017 10:30 AM  Encounter Date:  3/7/2017 Status:  Addendum     :  Марина Sinha NP (PHYS- Nurse Practitioner)        Related Notes: Original Note by Марина Sinha NP (PHYS- Nurse Practitioner) filed at 3/7/2017 10:29 AM            1. Stop ibuprofen and aleve. Start Lortab 5/325 mg 1-2 tablets 3 times daily as needed for rheumatoid arthritis pain. Start omeprazole 40 mg daily for upper abdominal discomfort. Obtain heme occult cards from 3 different stools and bring back to clinic lab once obtained.  You will be scheduled for CT of abdomen and pelvis. Depending upon heme occult results, may need to be set up for upper and lower endoscopy. Also try to bring back the urine sample.  2. You will be started on Toprol-XL 50 mg daily to slow your heart rate. You have atrial flutter. You also will be set up for an echocardiogram of your heart. You may need to start and anticoagulated but will not decide this until after we have more evaluation of your stomach.  3. You also will be set up for an MRI of the thoracic spine to evaluate your back pain further and also because of the questionable abnormality on the previous MRI.  Follow-up in clinic in 1 week, sooner with any problems.         Patient